# Patient Record
Sex: MALE | Race: WHITE
[De-identification: names, ages, dates, MRNs, and addresses within clinical notes are randomized per-mention and may not be internally consistent; named-entity substitution may affect disease eponyms.]

---

## 2020-11-09 ENCOUNTER — HOSPITAL ENCOUNTER (OUTPATIENT)
Dept: HOSPITAL 19 - COL.CAR | Age: 55
Discharge: HOME | End: 2020-11-09
Attending: INTERNAL MEDICINE
Payer: OTHER GOVERNMENT

## 2020-11-09 VITALS — SYSTOLIC BLOOD PRESSURE: 105 MMHG | HEART RATE: 90 BPM | DIASTOLIC BLOOD PRESSURE: 70 MMHG

## 2020-11-09 VITALS — WEIGHT: 218.26 LBS | BODY MASS INDEX: 32.33 KG/M2 | HEIGHT: 69 IN

## 2020-11-09 VITALS — DIASTOLIC BLOOD PRESSURE: 68 MMHG | SYSTOLIC BLOOD PRESSURE: 101 MMHG | HEART RATE: 92 BPM

## 2020-11-09 VITALS — DIASTOLIC BLOOD PRESSURE: 72 MMHG | SYSTOLIC BLOOD PRESSURE: 103 MMHG | HEART RATE: 91 BPM

## 2020-11-09 VITALS — SYSTOLIC BLOOD PRESSURE: 107 MMHG | DIASTOLIC BLOOD PRESSURE: 77 MMHG | HEART RATE: 89 BPM

## 2020-11-09 VITALS — SYSTOLIC BLOOD PRESSURE: 116 MMHG | DIASTOLIC BLOOD PRESSURE: 45 MMHG | HEART RATE: 95 BPM

## 2020-11-09 VITALS — HEART RATE: 90 BPM | DIASTOLIC BLOOD PRESSURE: 75 MMHG | SYSTOLIC BLOOD PRESSURE: 110 MMHG

## 2020-11-09 VITALS — SYSTOLIC BLOOD PRESSURE: 119 MMHG | HEART RATE: 93 BPM | DIASTOLIC BLOOD PRESSURE: 78 MMHG

## 2020-11-09 VITALS — TEMPERATURE: 98 F | DIASTOLIC BLOOD PRESSURE: 78 MMHG | SYSTOLIC BLOOD PRESSURE: 119 MMHG | HEART RATE: 93 BPM

## 2020-11-09 DIAGNOSIS — R94.39: ICD-10-CM

## 2020-11-09 DIAGNOSIS — E78.5: ICD-10-CM

## 2020-11-09 DIAGNOSIS — I20.0: ICD-10-CM

## 2020-11-09 DIAGNOSIS — I10: ICD-10-CM

## 2020-11-09 DIAGNOSIS — R07.9: Primary | ICD-10-CM

## 2020-11-09 DIAGNOSIS — Z88.2: ICD-10-CM

## 2020-11-09 LAB
ANION GAP SERPL CALC-SCNC: 10 MMOL/L (ref 7–16)
BUN SERPL-MCNC: 11 MG/DL (ref 9–20)
CALCIUM SERPL-MCNC: 9.4 MG/DL (ref 8.4–10.2)
CHLORIDE SERPL-SCNC: 96 MMOL/L (ref 98–107)
CO2 SERPL-SCNC: 27 MMOL/L (ref 22–30)
CREAT SERPL-SCNC: 1.16 UMOL/L (ref 0.66–1.25)
ERYTHROCYTE [DISTWIDTH] IN BLOOD BY AUTOMATED COUNT: 12 % (ref 11.5–14.5)
GLUCOSE SERPL-MCNC: 117 MG/DL (ref 74–106)
HCT VFR BLD AUTO: 43.4 % (ref 42–52)
HGB BLD-MCNC: 15.4 G/DL (ref 13.5–18)
INR BLD: 1.1 (ref 0.8–3)
MCH RBC QN AUTO: 32 PG (ref 27–31)
MCHC RBC AUTO-ENTMCNC: 36 G/DL (ref 33–37)
MCV RBC AUTO: 89 FL (ref 80–100)
PLATELET # BLD AUTO: 160 K/MM3 (ref 130–400)
PMV BLD AUTO: 10.1 FL (ref 7.4–10.4)
POTASSIUM SERPL-SCNC: 4.4 MMOL/L (ref 3.4–5)
PROTHROMBIN TIME: 12.4 SECONDS (ref 9.7–12.8)
RBC # BLD AUTO: 4.89 M/MM3 (ref 4.2–5.6)
SODIUM SERPL-SCNC: 134 MMOL/L (ref 137–145)

## 2020-11-09 PROCEDURE — C1769 GUIDE WIRE: HCPCS

## 2020-11-09 NOTE — NUR
To eu 12 via bed from cath lab, wife in room. Pt is alert to surroundings.
call light in reach, tele on. site remains clean and dry with band on. takes
coffee and crackers

## 2020-11-09 NOTE — NUR
started to release air in TR band, 2-3cc at at time over 15 min, no bleeding
or swelling noted, bandaid over the site with coban. Reviewed discharge inst.
with pt on care of site, activity, and next appt. Pt also not to take
metformin for 48 hours, with verbal understanding.